# Patient Record
Sex: MALE | Race: WHITE | NOT HISPANIC OR LATINO | Employment: UNEMPLOYED | URBAN - NONMETROPOLITAN AREA
[De-identification: names, ages, dates, MRNs, and addresses within clinical notes are randomized per-mention and may not be internally consistent; named-entity substitution may affect disease eponyms.]

---

## 2023-07-11 ENCOUNTER — OFFICE VISIT (OUTPATIENT)
Dept: INTERNAL MEDICINE | Facility: OTHER | Age: 63
End: 2023-07-11
Attending: STUDENT IN AN ORGANIZED HEALTH CARE EDUCATION/TRAINING PROGRAM
Payer: COMMERCIAL

## 2023-07-11 VITALS
RESPIRATION RATE: 18 BRPM | HEIGHT: 69 IN | DIASTOLIC BLOOD PRESSURE: 72 MMHG | TEMPERATURE: 98.3 F | BODY MASS INDEX: 28.41 KG/M2 | HEART RATE: 118 BPM | WEIGHT: 191.8 LBS | OXYGEN SATURATION: 96 % | SYSTOLIC BLOOD PRESSURE: 112 MMHG

## 2023-07-11 DIAGNOSIS — Z13.220 ENCOUNTER FOR SCREENING FOR LIPID DISORDER: ICD-10-CM

## 2023-07-11 DIAGNOSIS — F17.200 TOBACCO USE DISORDER: ICD-10-CM

## 2023-07-11 DIAGNOSIS — Z11.4 SCREENING FOR HIV WITHOUT PRESENCE OF RISK FACTORS: ICD-10-CM

## 2023-07-11 DIAGNOSIS — Z12.5 ENCOUNTER FOR SCREENING FOR MALIGNANT NEOPLASM OF PROSTATE: ICD-10-CM

## 2023-07-11 DIAGNOSIS — Z71.6 ENCOUNTER FOR TOBACCO USE CESSATION COUNSELING: ICD-10-CM

## 2023-07-11 DIAGNOSIS — Z12.11 ENCOUNTER FOR SCREENING COLONOSCOPY: ICD-10-CM

## 2023-07-11 DIAGNOSIS — F43.21 ADJUSTMENT DISORDER WITH DEPRESSED MOOD: ICD-10-CM

## 2023-07-11 DIAGNOSIS — Z00.00 ANNUAL PHYSICAL EXAM: Primary | ICD-10-CM

## 2023-07-11 DIAGNOSIS — Z11.59 NEED FOR HEPATITIS C SCREENING TEST: ICD-10-CM

## 2023-07-11 DIAGNOSIS — M48.061 SPINAL STENOSIS, LUMBAR REGION, WITHOUT NEUROGENIC CLAUDICATION: ICD-10-CM

## 2023-07-11 DIAGNOSIS — R06.02 SHORTNESS OF BREATH: ICD-10-CM

## 2023-07-11 LAB
ALBUMIN SERPL BCG-MCNC: 4.4 G/DL (ref 3.5–5.2)
ALP SERPL-CCNC: 76 U/L (ref 40–129)
ALT SERPL W P-5'-P-CCNC: 14 U/L (ref 0–70)
ANION GAP SERPL CALCULATED.3IONS-SCNC: 10 MMOL/L (ref 7–15)
AST SERPL W P-5'-P-CCNC: 15 U/L (ref 0–45)
BILIRUB SERPL-MCNC: 0.2 MG/DL
BUN SERPL-MCNC: 13.3 MG/DL (ref 8–23)
CALCIUM SERPL-MCNC: 9.8 MG/DL (ref 8.8–10.2)
CHLORIDE SERPL-SCNC: 104 MMOL/L (ref 98–107)
CHOLEST SERPL-MCNC: 256 MG/DL
CREAT SERPL-MCNC: 0.73 MG/DL (ref 0.67–1.17)
DEPRECATED HCO3 PLAS-SCNC: 25 MMOL/L (ref 22–29)
GFR SERPL CREATININE-BSD FRML MDRD: >90 ML/MIN/1.73M2
GLUCOSE SERPL-MCNC: 90 MG/DL (ref 70–99)
HDLC SERPL-MCNC: 44 MG/DL
HOLD SPECIMEN: NORMAL
LDLC SERPL CALC-MCNC: 162 MG/DL
NONHDLC SERPL-MCNC: 212 MG/DL
POTASSIUM SERPL-SCNC: 4.6 MMOL/L (ref 3.4–5.3)
PROT SERPL-MCNC: 7.6 G/DL (ref 6.4–8.3)
PSA SERPL DL<=0.01 NG/ML-MCNC: 1.58 NG/ML (ref 0–4.5)
SODIUM SERPL-SCNC: 139 MMOL/L (ref 136–145)
TRIGL SERPL-MCNC: 251 MG/DL

## 2023-07-11 PROCEDURE — 87389 HIV-1 AG W/HIV-1&-2 AB AG IA: CPT | Mod: ZL | Performed by: STUDENT IN AN ORGANIZED HEALTH CARE EDUCATION/TRAINING PROGRAM

## 2023-07-11 PROCEDURE — 84153 ASSAY OF PSA TOTAL: CPT | Mod: ZL | Performed by: STUDENT IN AN ORGANIZED HEALTH CARE EDUCATION/TRAINING PROGRAM

## 2023-07-11 PROCEDURE — 90715 TDAP VACCINE 7 YRS/> IM: CPT | Performed by: STUDENT IN AN ORGANIZED HEALTH CARE EDUCATION/TRAINING PROGRAM

## 2023-07-11 PROCEDURE — 99204 OFFICE O/P NEW MOD 45 MIN: CPT | Mod: 25 | Performed by: STUDENT IN AN ORGANIZED HEALTH CARE EDUCATION/TRAINING PROGRAM

## 2023-07-11 PROCEDURE — 90471 IMMUNIZATION ADMIN: CPT | Performed by: STUDENT IN AN ORGANIZED HEALTH CARE EDUCATION/TRAINING PROGRAM

## 2023-07-11 PROCEDURE — 80053 COMPREHEN METABOLIC PANEL: CPT | Mod: ZL | Performed by: STUDENT IN AN ORGANIZED HEALTH CARE EDUCATION/TRAINING PROGRAM

## 2023-07-11 PROCEDURE — G0103 PSA SCREENING: HCPCS | Mod: ZL | Performed by: STUDENT IN AN ORGANIZED HEALTH CARE EDUCATION/TRAINING PROGRAM

## 2023-07-11 PROCEDURE — 86803 HEPATITIS C AB TEST: CPT | Mod: ZL | Performed by: STUDENT IN AN ORGANIZED HEALTH CARE EDUCATION/TRAINING PROGRAM

## 2023-07-11 PROCEDURE — 99406 BEHAV CHNG SMOKING 3-10 MIN: CPT | Performed by: STUDENT IN AN ORGANIZED HEALTH CARE EDUCATION/TRAINING PROGRAM

## 2023-07-11 PROCEDURE — 99386 PREV VISIT NEW AGE 40-64: CPT | Mod: 25 | Performed by: STUDENT IN AN ORGANIZED HEALTH CARE EDUCATION/TRAINING PROGRAM

## 2023-07-11 PROCEDURE — 36415 COLL VENOUS BLD VENIPUNCTURE: CPT | Mod: ZL | Performed by: STUDENT IN AN ORGANIZED HEALTH CARE EDUCATION/TRAINING PROGRAM

## 2023-07-11 PROCEDURE — 80061 LIPID PANEL: CPT | Mod: ZL | Performed by: STUDENT IN AN ORGANIZED HEALTH CARE EDUCATION/TRAINING PROGRAM

## 2023-07-11 RX ORDER — ALBUTEROL SULFATE 90 UG/1
2 AEROSOL, METERED RESPIRATORY (INHALATION) EVERY 4 HOURS PRN
Qty: 18 G | Refills: 3 | Status: SHIPPED | OUTPATIENT
Start: 2023-07-11

## 2023-07-11 RX ORDER — BUPROPION HYDROCHLORIDE 150 MG/1
150 TABLET ORAL EVERY MORNING
Qty: 90 TABLET | Refills: 4 | Status: SHIPPED | OUTPATIENT
Start: 2023-07-11

## 2023-07-11 ASSESSMENT — PAIN SCALES - GENERAL: PAINLEVEL: MILD PAIN (3)

## 2023-07-11 NOTE — LETTER
July 17, 2023      Ray Ace  [address] [place] [address]        Dear ,    We are writing to inform you of your test results.    Your laboratory results are below.  Your kidney function electrolytes and liver testing is all normal.  Your cholesterol is markedly elevated.  I would like to discuss with you starting a cholesterol-lowering medication to help prevent your risk of heart disease and stroke at your next visit.    Resulted Orders   Comprehensive Metabolic Panel   Result Value Ref Range    Sodium 139 136 - 145 mmol/L    Potassium 4.6 3.4 - 5.3 mmol/L    Chloride 104 98 - 107 mmol/L    Carbon Dioxide (CO2) 25 22 - 29 mmol/L    Anion Gap 10 7 - 15 mmol/L    Urea Nitrogen 13.3 8.0 - 23.0 mg/dL    Creatinine 0.73 0.67 - 1.17 mg/dL    Calcium 9.8 8.8 - 10.2 mg/dL    Glucose 90 70 - 99 mg/dL    Alkaline Phosphatase 76 40 - 129 U/L    AST 15 0 - 45 U/L      Comment:      Reference intervals for this test were updated on 6/12/2023 to more accurately reflect our healthy population. There may be differences in the flagging of prior results with similar values performed with this method. Interpretation of those prior results can be made in the context of the updated reference intervals.    ALT 14 0 - 70 U/L      Comment:      Reference intervals for this test were updated on 6/12/2023 to more accurately reflect our healthy population. There may be differences in the flagging of prior results with similar values performed with this method. Interpretation of those prior results can be made in the context of the updated reference intervals.      Protein Total 7.6 6.4 - 8.3 g/dL    Albumin 4.4 3.5 - 5.2 g/dL    Bilirubin Total 0.2 <=1.2 mg/dL    GFR Estimate >90 >60 mL/min/1.73m2   Lipid Panel   Result Value Ref Range    Cholesterol 256 (H) <200 mg/dL    Triglycerides 251 (H) <150 mg/dL    Direct Measure HDL 44 >=40 mg/dL    LDL Cholesterol Calculated 162 (H) <=100 mg/dL    Non HDL  Cholesterol 212 (H) <130 mg/dL    Narrative    Cholesterol  Desirable:  <200 mg/dL    Triglycerides  Normal:  Less than 150 mg/dL  Borderline High:  150-199 mg/dL  High:  200-499 mg/dL  Very High:  Greater than or equal to 500 mg/dL    Direct Measure HDL  Female:  Greater than or equal to 50 mg/dL   Male:  Greater than or equal to 40 mg/dL    LDL Cholesterol  Desirable:  <100mg/dL  Above Desirable:  100-129 mg/dL   Borderline High:  130-159 mg/dL   High:  160-189 mg/dL   Very High:  >= 190 mg/dL    Non HDL Cholesterol  Desirable:  130 mg/dL  Above Desirable:  130-159 mg/dL  Borderline High:  160-189 mg/dL  High:  190-219 mg/dL  Very High:  Greater than or equal to 220 mg/dL   HIV Antigen Antibody Combo   Result Value Ref Range    HIV Antigen Antibody Combo Nonreactive Nonreactive      Comment:      HIV-1 p24 Ag & HIV-1/HIV-2 Ab Not Detected   Hepatitis C Screen Reflex to HCV RNA Quant and Genotype   Result Value Ref Range    Hepatitis C Antibody Nonreactive Nonreactive    Narrative    Assay performance characteristics have not been established for newborns, infants, and children.   PSA Screen GH   Result Value Ref Range    Prostate Specific Antigen Screen 1.58 0.00 - 4.50 ng/mL    Narrative    This result is obtained using the Roche Elecsys total PSA method on the ehsan e601 immunoassay analyzer. Results obtained with different assay methods or kits cannot be used interchangeably.       If you have any questions or concerns, please call the clinic at the number listed above.       Sincerely,      Torrey Mancuso MD

## 2023-07-11 NOTE — PROGRESS NOTES
SUBJECTIVE:   CC: Ray is an 63 year old who presents for preventative health visit.        No data to display              HPI    Three concerns.     1) COPD  -smoker, wants to quit  -can't quit this time.   -No stamina, 10-20 min then has to sit down.   -2 bouts of bronchitis in past year.   - smoking less than a pack per day.   - smoking 40+ years.   Has to sit down due to shortness of breath and fatigue.   Purses lips when breathing out.   No chest discomfort.   No new swelling   No palpitations  Has inhaler, doesn't seem to help him.     Doesn't think he can work currently. Wants to go back to work.     2) mobility  Less mobile.   Low back discomfort  Also stiff in low back and legs.   Had been immobile last year or so.   Difficulty getting up.   Limited muscle tone in thighs.   Limited range of motion.       3) mild depression as a result of 1 and 2.     Discussed and updated his past medical history surgical history social history and medications.  Discussed age-appropriate relevant screening tests.      Have you ever done Advance Care Planning? (For example, a Health Directive, POLST, or a discussion with a medical provider or your loved ones about your wishes): No, advance care planning information given to patient to review.  Patient plans to discuss their wishes with loved ones or provider.      Social History     Tobacco Use     Smoking status: Every Day     Types: Cigarettes     Smokeless tobacco: Never   Substance Use Topics     Alcohol use: Yes     Comment: very rarely              No data to display                Last PSA:   Prostate Specific Antigen Screen   Date Value Ref Range Status   07/11/2023 1.58 0.00 - 4.50 ng/mL Final       Reviewed orders with patient. Reviewed health maintenance and updated orders accordingly - Yes  Lab work is in process    Reviewed and updated as needed this visit by clinical staff   Tobacco  Allergies  Meds      Soc Hx        Reviewed and updated as needed this  "visit by Provider                 No past medical history on file.   Past Surgical History:   Procedure Laterality Date     COLONOSCOPY      Age 36 due to bleeding     wisdom teeth      in teens       Review of Systems  Comprehensive 12 point review of systems other than those listed in the HPI are otherwise negative.       OBJECTIVE:   /72 (BP Location: Right arm, Patient Position: Sitting, Cuff Size: Adult Regular)   Pulse 118   Temp 98.3  F (36.8  C) (Temporal)   Resp 18   Ht 1.74 m (5' 8.5\")   Wt 87 kg (191 lb 12.8 oz)   SpO2 96%   BMI 28.74 kg/m      Physical Exam  Vitals and nursing note reviewed.   Constitutional:       General: He is not in acute distress.     Appearance: He is well-developed. He is not diaphoretic.   HENT:      Head: Normocephalic and atraumatic.      Mouth/Throat:      Pharynx: Oropharynx is clear.   Eyes:      General: No scleral icterus.     Conjunctiva/sclera: Conjunctivae normal.   Cardiovascular:      Rate and Rhythm: Normal rate and regular rhythm.   Pulmonary:      Effort: Pulmonary effort is normal.      Breath sounds: Normal breath sounds.   Abdominal:      Palpations: Abdomen is soft.      Tenderness: There is no abdominal tenderness.   Musculoskeletal:         General: No deformity.      Cervical back: Neck supple.   Lymphadenopathy:      Cervical: No cervical adenopathy.   Skin:     General: Skin is warm and dry.      Coloration: Skin is not jaundiced.      Findings: No rash.      Comments: Multiple seborrheic keratoses, no hypermelanotic lesions or atypical nevi.   Neurological:      General: No focal deficit present.      Mental Status: He is alert. Mental status is at baseline.   Psychiatric:         Mood and Affect: Mood normal.         Behavior: Behavior normal.           ASSESSMENT/PLAN:       ICD-10-CM    1. Annual physical exam  Z00.00 Comprehensive Metabolic Panel     Comprehensive Metabolic Panel      2. Encounter for screening colonoscopy  Z12.11 " Colonoscopy Screening  Referral      3. Spinal stenosis, lumbar region, without neurogenic claudication  M48.061 Physical Therapy Referral      4. Need for hepatitis C screening test  Z11.59 Hepatitis C Screen Reflex to HCV RNA Quant and Genotype     Hepatitis C Screen Reflex to HCV RNA Quant and Genotype      5. Screening for HIV without presence of risk factors  Z11.4 HIV Antigen Antibody Combo     HIV Antigen Antibody Combo      6. Encounter for screening for malignant neoplasm of prostate  Z12.5 PSA Screen GH     PSA Screen GH      7. Encounter for screening for lipid disorder  Z13.220 Lipid Panel     Lipid Panel      8. Encounter for tobacco use cessation counseling  Z71.6 buPROPion (WELLBUTRIN XL) 150 MG 24 hr tablet     SMOKING CESSATION COUNSELING, 3-10 MIN      9. Tobacco use disorder  F17.200 albuterol (PROAIR HFA/PROVENTIL HFA/VENTOLIN HFA) 108 (90 Base) MCG/ACT inhaler     buPROPion (WELLBUTRIN XL) 150 MG 24 hr tablet      10. Shortness of breath  R06.02 albuterol (PROAIR HFA/PROVENTIL HFA/VENTOLIN HFA) 108 (90 Base) MCG/ACT inhaler     Pulmonary Function Test ()      11. Adjustment disorder with depressed mood  F43.21         Annual physical exam, establish care: Updated past medical history surgical history social history and medications.  High suspicion the patient has COPD given his prolonged smoking history and shortness of breath with pursing of his lips.  We will obtain basic PFTs and was given a refill for albuterol.  Discussed smoking cessation for 5 minutes.  The patient would like to try bupropion in addition to quitting cold turkey.  He will let me know if he like to try patches or gum on top of this.  Hopefully the bupropion will also help with his mild depression.  Patient also has poor mobility, exercise tolerance and limited range of motion.  Endorses an immobile lifestyle.  Will refer to physical therapy for strengthening, range of motion and if still ongoing could pursue  "MRI of the back for further evaluation of his lumbar stenosis and could pursue rheumatologic work-up at that time if necessary.    Colonoscopy and basic screening labs were ordered today.    Patient has been advised of split billing requirements and indicates understanding: Yes      COUNSELING:   Reviewed preventive health counseling, as reflected in patient instructions      BMI:   Estimated body mass index is 28.74 kg/m  as calculated from the following:    Height as of this encounter: 1.74 m (5' 8.5\").    Weight as of this encounter: 87 kg (191 lb 12.8 oz).         He reports that he has been smoking cigarettes. He has never used smokeless tobacco.  Nicotine/Tobacco Cessation Plan:   Above        Torrey Mancuso MD  [facility] AND [facility]  "

## 2023-07-11 NOTE — NURSING NOTE
"Chief Complaint   Patient presents with     Physical     And est care       Medication reconciliation completed.    FOOD SECURITY SCREENING QUESTIONS:    The next two questions are to help us understand your food security.  If you are feeling you need any assistance in this area, we have resources available to support you today.    Hunger Vital Signs:  Within the past 12 months we worried whether our food would run out before we got money to buy more. Never  Within the past 12 months the food we bought just didn't last and we didn't have money to get more. Never    Initial /72 (BP Location: Right arm, Patient Position: Sitting, Cuff Size: Adult Regular)   Pulse 118   Temp 98.3  F (36.8  C) (Temporal)   Resp 18   Ht 1.74 m (5' 8.5\")   Wt 87 kg (191 lb 12.8 oz)   SpO2 96%   BMI 28.74 kg/m   Estimated body mass index is 28.74 kg/m  as calculated from the following:    Height as of this encounter: 1.74 m (5' 8.5\").    Weight as of this encounter: 87 kg (191 lb 12.8 oz).       Naheed Mccord LPN .......  7/11/2023  1:19 PM  "

## 2023-07-13 LAB
HCV AB SERPL QL IA: NONREACTIVE
HIV 1+2 AB+HIV1 P24 AG SERPL QL IA: NONREACTIVE

## 2023-07-14 DIAGNOSIS — Z12.11 SCREENING FOR COLON CANCER: Primary | ICD-10-CM

## 2023-07-14 NOTE — TELEPHONE ENCOUNTER
Screening Questions for the Scheduling of Screening Colonoscopies   (If Colonoscopy is diagnostic, Provider should review the chart before scheduling.)  Are you younger than 50 or older than 80?  NO  Do you take aspirin or fish oil?  NO (if yes, tell patient to stop 1 week prior to Colonoscopy)  Do you take warfarin (Coumadin), clopidogrel (Plavix), apixaban (Eliquis), dabigatram (Pradaxa), rivaroxaban (Xarelto) or any blood thinner? NO  Do you use oxygen at home?  NO  Do you have kidney disease? NO  Are you on dialysis? NO  Have you had a stroke or heart attack in the last year? NO  Have you had a stent in your heart or any blood vessel in the last year? NO  Have you had a transplant of any organ? NO  Have you had a colonoscopy or upper endoscopy (EGD) before? YES         When?  25 YEARS AGO  Date of scheduled Colonoscopy. 10/17/2023  Provider [facility]  Pharmacy [facility]

## 2023-07-17 RX ORDER — BISACODYL 5 MG
TABLET, DELAYED RELEASE (ENTERIC COATED) ORAL
Qty: 2 TABLET | Refills: 0 | Status: ON HOLD | OUTPATIENT
Start: 2023-07-17 | End: 2023-10-17

## 2023-07-17 RX ORDER — POLYETHYLENE GLYCOL 3350, SODIUM CHLORIDE, SODIUM BICARBONATE, POTASSIUM CHLORIDE 420; 11.2; 5.72; 1.48 G/4L; G/4L; G/4L; G/4L
4000 POWDER, FOR SOLUTION ORAL ONCE
Qty: 4000 ML | Refills: 0 | Status: SHIPPED | OUTPATIENT
Start: 2023-07-17 | End: 2023-07-17

## 2023-07-27 ENCOUNTER — THERAPY VISIT (OUTPATIENT)
Dept: PHYSICAL THERAPY | Facility: OTHER | Age: 63
End: 2023-07-27
Attending: STUDENT IN AN ORGANIZED HEALTH CARE EDUCATION/TRAINING PROGRAM
Payer: COMMERCIAL

## 2023-07-27 DIAGNOSIS — M48.061 SPINAL STENOSIS, LUMBAR REGION, WITHOUT NEUROGENIC CLAUDICATION: ICD-10-CM

## 2023-07-27 PROCEDURE — 97110 THERAPEUTIC EXERCISES: CPT | Mod: GP

## 2023-07-27 PROCEDURE — 97161 PT EVAL LOW COMPLEX 20 MIN: CPT | Mod: GP

## 2023-07-27 NOTE — PROGRESS NOTES
"PHYSICAL THERAPY EVALUATION  Type of Visit: Evaluation    See electronic medical record for Abuse and Falls Screening details.    Subjective       Presenting condition or subjective complaint:   Patient reports he has had progressive pain/stiffness in B hips/LE s for the past 10 years -- reports it has been more rapid in the last 2 years. Reports dull/achey pain with movement; will have sharp pain on the lateral/posterior sides on hips. Reports L is worse than R. Reports sitting/rest has no pain but once he tries to stand or do any mobility will be instantaneous. Reports worst pain with bending, lifting, squatting -- reports difficulty with getting shoes/socks on and needs to use aids. States he has found himself bending forward to pick something off the ground and will \"tip forward\". States he is unable to get off of ground without UE support. States he was in [place] last year and was walking up to a mile but currently unable to walk more than a block or so. States he has to use the cart to make it through [facility]. Patient does frequently mention he has difficulty maintaining motivation to \"do stretches/exercises/things I should do.\" Denies numbness/tingling in LE. Reports he is a side sleeper and when rolling to opposite side the superior hip will be unbearable pain. Patient states he feels like his legs have no strength.    Relevant medical history:   none  Dates & types of surgery:   none    Prior diagnostic imaging/testing results:     none in chart -- patient reports he had an x-ray ~10 years ago which shoulder spinal stenosis  Prior therapy history for the same diagnosis, illness or injury:     none    Prior Level of Function  Transfers: Independent  Ambulation: Independent  ADL: Independent, requires use of adaptive equipment  IADL: aggravated with housekeeping    Living Environment  Social support:   wife  Type of home:     Stairs to enter the home:        steps (4-5) to deck  Ramp:     Stairs inside the " "home:       flight of stairs, reciprocal - patient reports he is fearful of caring objects up/down stairs  Help at home:     Equipment owned:   uses sock aid, grabber, shoe horn     Employment:     retired/wants to return to work  Hobbies/Interests:      Patient goals for therapy:   \"I want to know if this is something we can fix or if surgery is my next option\"    Pain assessment: See objective evaluation for additional pain details     Objective   HIP EVALUATION  PAIN: Pain Level at Rest: 2/10  Pain Level with Use: 7/10  Pain Location: lumbar spine and hip  Pain Quality: Dull and Sharp  Pain Frequency: constant  Pain is Worst: bending, lifting, squatting  Pain is Exacerbated By: bending, sitting, walking, certain positions, bending, daily self-care tasks  Pain is Relieved By: rest  Pain Progression: Worsened  INTEGUMENTARY (edema, incisions): WNL  POSTURE:  forward flexed posture, forward head  GAIT:   Weightbearing Status: WBAT  Assistive Device(s): None  Gait Deviations:  decreased lumbopelvic rotation; somewhat vaulting on the R  BALANCE/PROPRIOCEPTION: Single Leg Stance Eyes Open (seconds): 30 seconds B without UE support; trunk compensated trendelenburg B; ankle response  WEIGHTBEARING ALIGNMENT:  R ASIS superior to L  ROM:   (Degrees) Left AROM Left PROM  Right AROM Right PROM   Hip Flexion 90  90    Hip Extension 0  0    Hip Abduction 5  5    Hip Adduction       Hip Internal Rotation 0  0    Hip External Rotation 35  35    Knee Flexion       Knee Extension       Lumbar Side glide     Lumbar Flexion Minimal lumbar flexion movement; compensates with thoracic spine   Lumbar Extension    Pain:   End feel: hard end feel with hip ROM  PELVIC/SI SCREEN: Standing Forward Bend: compensates with thoracic spine - no difference in PSIS location  STRENGTH:   Pain: - none + mild ++ moderate +++ severe  Strength Scale: 0-5/5 Left Right   Hip Flexion 3 3   Hip Extension 3 3   Hip Abduction *in limited range 4 4   Hip " Adduction 3 3   Hip Internal Rotation     Hip External Rotation     Knee Flexion 3 3   Knee Extension 4 4     LE FLEXIBILITY: Decreased piriformis L, Decreased hip flexors L, Decreased quadriceps L, Decreased hamstrings L, Decreased piriformis R, Decreased hip flexors R, Decreased quadriceps R, Decreased hamstrings R  SPECIAL TESTS:    Left Right   PAYAL Negative  Negative    FADIR/Labrum/WILLIE Negative  Negative    Femoral Nerve       Tressa's     Piriformis     Quadrant Testing     SLR     Slump     Stork with Extension     Jimmie Positive Positive          FUNCTIONAL TESTS: SLS: see above  PALPATION:  denies pain with palpation of B greater trochanter, PSIS  LUMBAR SPINE EVALUATION  PALPATION:  denies pain with palpation of lumbar spinous/transverse processes  SPINAL SEGMENTAL CONCLUSIONS:  hypomobile PA glides from L1-L5;      Assessment & Plan   CLINICAL IMPRESSIONS  Medical Diagnosis: M48.061 (ICD-10-CM) - Spinal stenosis, lumbar region, without neurogenic claudication    Treatment Diagnosis: B hip pain and weakness   Impression/Assessment: Patient is a 63 year old male with low back and B hip pain complaints.  The following significant findings have been identified: Pain, Decreased ROM/flexibility, Decreased joint mobility, Decreased strength, Impaired balance, Impaired gait, Impaired muscle performance, Decreased activity tolerance, and Impaired posture. These impairments interfere with their ability to perform self care tasks and community mobility as compared to previous level of function.     Clinical Decision Making (Complexity):  Clinical Presentation: Stable/Uncomplicated  Clinical Presentation Rationale: based on medical and personal factors listed in PT evaluation  Clinical Decision Making (Complexity): Low complexity    PLAN OF CARE  Treatment Interventions:  Modalities: Cryotherapy, E-stim, Hot Pack  Interventions: Gait Training, Manual Therapy, Neuromuscular Re-education, Therapeutic Activity,  Therapeutic Exercise    Long Term Goals     PT Goal 1  Goal Identifier: proximal hip strength  Goal Description: Patient will increase proximal hip strength to 4+/5 in all directions within 6 weeks  Target Date: 09/07/23  PT Goal 2  Goal Identifier: gait mechanics/distance  Goal Description: Patient will demonstrate upright posture and increased lumbopelvic rotation with ambulation and report ability to ambulate 1 mile within 6 weeks  Target Date: 09/07/23  PT Goal 3  Goal Identifier: HEP  Goal Description: Patient will perform HEP independently with apporpriate progressions within 2 weeks  Target Date: 08/10/23      Frequency of Treatment: 2x week  Duration of Treatment: 6 weeks    Recommended Referrals to Other Professionals:   Education Assessment:   Learner/Method: Patient;Listening;Reading;Demonstration;Pictures/Video    Risks and benefits of evaluation/treatment have been explained.   Patient/Family/caregiver agrees with Plan of Care.     Evaluation Time:     PT Eval, Low Complexity Minutes (79436): 30       Signing Clinician: Bel Baker PT

## 2023-07-28 ENCOUNTER — HOSPITAL ENCOUNTER (OUTPATIENT)
Dept: RESPIRATORY THERAPY | Facility: OTHER | Age: 63
Discharge: HOME | End: 2023-07-28
Attending: STUDENT IN AN ORGANIZED HEALTH CARE EDUCATION/TRAINING PROGRAM | Admitting: STUDENT IN AN ORGANIZED HEALTH CARE EDUCATION/TRAINING PROGRAM
Payer: COMMERCIAL

## 2023-07-28 DIAGNOSIS — R06.02 SHORTNESS OF BREATH: ICD-10-CM

## 2023-07-28 PROCEDURE — 94726 PLETHYSMOGRAPHY LUNG VOLUMES: CPT

## 2023-07-28 PROCEDURE — 94729 DIFFUSING CAPACITY: CPT | Mod: 26 | Performed by: INTERNAL MEDICINE

## 2023-07-28 PROCEDURE — 94060 EVALUATION OF WHEEZING: CPT

## 2023-07-28 PROCEDURE — 999N000157 HC STATISTIC RCP TIME EA 10 MIN

## 2023-07-28 PROCEDURE — 94060 EVALUATION OF WHEEZING: CPT | Mod: 26 | Performed by: INTERNAL MEDICINE

## 2023-07-28 PROCEDURE — 250N000009 HC RX 250: Performed by: STUDENT IN AN ORGANIZED HEALTH CARE EDUCATION/TRAINING PROGRAM

## 2023-07-28 PROCEDURE — 94729 DIFFUSING CAPACITY: CPT

## 2023-07-28 PROCEDURE — 94726 PLETHYSMOGRAPHY LUNG VOLUMES: CPT | Mod: 26 | Performed by: INTERNAL MEDICINE

## 2023-07-28 RX ORDER — ALBUTEROL SULFATE 0.83 MG/ML
2.5 SOLUTION RESPIRATORY (INHALATION) ONCE
Status: COMPLETED | OUTPATIENT
Start: 2023-07-28 | End: 2023-07-28

## 2023-07-28 RX ADMIN — ALBUTEROL SULFATE 2.5 MG: 2.5 SOLUTION RESPIRATORY (INHALATION) at 09:45

## 2023-07-31 ENCOUNTER — THERAPY VISIT (OUTPATIENT)
Dept: PHYSICAL THERAPY | Facility: OTHER | Age: 63
End: 2023-07-31
Attending: STUDENT IN AN ORGANIZED HEALTH CARE EDUCATION/TRAINING PROGRAM
Payer: COMMERCIAL

## 2023-07-31 DIAGNOSIS — M48.061 SPINAL STENOSIS, LUMBAR REGION, WITHOUT NEUROGENIC CLAUDICATION: Primary | ICD-10-CM

## 2023-07-31 PROCEDURE — 97110 THERAPEUTIC EXERCISES: CPT | Mod: GP

## 2023-08-03 ENCOUNTER — THERAPY VISIT (OUTPATIENT)
Dept: PHYSICAL THERAPY | Facility: OTHER | Age: 63
End: 2023-08-03
Attending: STUDENT IN AN ORGANIZED HEALTH CARE EDUCATION/TRAINING PROGRAM
Payer: COMMERCIAL

## 2023-08-03 DIAGNOSIS — M48.061 SPINAL STENOSIS, LUMBAR REGION, WITHOUT NEUROGENIC CLAUDICATION: Primary | ICD-10-CM

## 2023-08-03 PROCEDURE — 97110 THERAPEUTIC EXERCISES: CPT | Mod: GP

## 2023-08-07 ENCOUNTER — THERAPY VISIT (OUTPATIENT)
Dept: PHYSICAL THERAPY | Facility: OTHER | Age: 63
End: 2023-08-07
Attending: STUDENT IN AN ORGANIZED HEALTH CARE EDUCATION/TRAINING PROGRAM
Payer: COMMERCIAL

## 2023-08-07 DIAGNOSIS — M48.061 SPINAL STENOSIS, LUMBAR REGION, WITHOUT NEUROGENIC CLAUDICATION: Primary | ICD-10-CM

## 2023-08-07 PROCEDURE — 97110 THERAPEUTIC EXERCISES: CPT | Mod: GP

## 2023-08-10 ENCOUNTER — THERAPY VISIT (OUTPATIENT)
Dept: PHYSICAL THERAPY | Facility: OTHER | Age: 63
End: 2023-08-10
Attending: STUDENT IN AN ORGANIZED HEALTH CARE EDUCATION/TRAINING PROGRAM
Payer: COMMERCIAL

## 2023-08-10 DIAGNOSIS — M48.061 SPINAL STENOSIS, LUMBAR REGION, WITHOUT NEUROGENIC CLAUDICATION: Primary | ICD-10-CM

## 2023-08-10 PROCEDURE — 97110 THERAPEUTIC EXERCISES: CPT | Mod: GP

## 2023-08-14 ENCOUNTER — THERAPY VISIT (OUTPATIENT)
Dept: PHYSICAL THERAPY | Facility: OTHER | Age: 63
End: 2023-08-14
Attending: STUDENT IN AN ORGANIZED HEALTH CARE EDUCATION/TRAINING PROGRAM
Payer: COMMERCIAL

## 2023-08-14 DIAGNOSIS — M48.061 SPINAL STENOSIS, LUMBAR REGION, WITHOUT NEUROGENIC CLAUDICATION: Primary | ICD-10-CM

## 2023-08-14 PROCEDURE — 97110 THERAPEUTIC EXERCISES: CPT | Mod: GP

## 2023-08-17 ENCOUNTER — THERAPY VISIT (OUTPATIENT)
Dept: PHYSICAL THERAPY | Facility: OTHER | Age: 63
End: 2023-08-17
Attending: STUDENT IN AN ORGANIZED HEALTH CARE EDUCATION/TRAINING PROGRAM
Payer: COMMERCIAL

## 2023-08-17 DIAGNOSIS — M48.061 SPINAL STENOSIS, LUMBAR REGION, WITHOUT NEUROGENIC CLAUDICATION: Primary | ICD-10-CM

## 2023-08-17 PROCEDURE — 97110 THERAPEUTIC EXERCISES: CPT | Mod: GP

## 2023-08-17 NOTE — PROGRESS NOTES
"   08/17/23 0500   Appointment Info   Signing clinician's name / credentials Bel Baker, PT   Visits Used 7   Medical Diagnosis M48.061 (ICD-10-CM) - Spinal stenosis, lumbar region, without neurogenic claudication   PT Tx Diagnosis B hip pain and weakness   Progress Note/Certification   Therapy Frequency 2x week   Predicted Duration 6 weeks   GOALS   PT Goals 2;3   PT Goal 1   Goal Identifier proximal hip strength   Goal Description Patient will increase proximal hip strength to 4+/5 in all directions within 6 weeks   Goal Progress see objective measures; hip abduction/extension 4+/5; hip flexion 3+/5   Target Date 09/07/23   PT Goal 2   Goal Identifier gait mechanics/distance   Goal Description Patient will demonstrate upright posture and increased lumbopelvic rotation with ambulation and report ability to ambulate 1 mile within 6 weeks   Goal Progress patient reports ability to walk longer distance/tolerance but overall no major change in ADL/function   Target Date 09/07/23   PT Goal 3   Goal Identifier HEP   Goal Description Patient will perform HEP independently with apporpriate progressions within 2 weeks   Goal Progress met   Target Date 08/10/23   Date Met 08/17/23   Subjective Report   Subjective Report Patient reports he is tired this AM - reports breathing a little labored due to smoke in the air. Reports soreness is present. States overall with therapy can see a difference in his strength but not much in terms of ADLs (bending forward to get socks on, pick something off the floor, etc.) -- states he continues to feel like he gets \"stuck\" but has more strength to get back up.   Objective Measures   Objective Measures Objective Measure 1;Objective Measure 2   Objective Measure 1   Objective Measure B hip strength   Details hip flexion 3+/5; hip abduction 4+/5; hip extension 4+/5   Objective Measure 2   Objective Measure B hip ROM   Details hip flexion ~90 degrees; 0 degrees IR; ~15 degrees hip abduction " "with hard endfeel   Treatment Interventions (PT)   Interventions Therapeutic Procedure/Exercise   Therapeutic Procedure/Exercise   Therapeutic Procedures: strength, endurance, ROM, flexibillity minutes (28168) 45   Ther Proc 1 SciFit xL3 x9 min; lower trunk rotation x10 reps B, SKTC x20 seconds x3 reps B (hold due to groin discomfort), supine hip flexor stretch with opposite LE hang off side of mat table; hamstring stretch supine with band x20 seconds B; bridge x10 reps with cues for eccentric control and 5 second hold at time x10 reps RTB with hip abduction; clam shells x10 reps x2 sets B RTB; sit to stand from low surface x10 reps x2 sets; side steps without resistance -- patient able to perform to ~hip width apart B; standing hip extension x10 reps B (cues for not arching back or trunk movement); lateral step ups 6\" step x15 reps B   Ther Proc 1 - Details added RTB and standing hip extension   Skilled Intervention education on HEP, building exercises/stretches into day to increase likelihood of completion; education on tests/measures and findings   Patient Response/Progress reports he feels stronger but continues to hit his ROM limits   Education   Learner/Method Patient;Listening;Reading;Demonstration;Pictures/Video   Plan   Home program lower trunk rotation, SKTC, hamstring stretch - added 8/3: bridge, clam shells, side steps without resistance   Plan for next session patient continues to have limited ROM and limited change in function over the last month -- has been consistently performing HEP and increased strength demonstrated but continue to have significant limited ROM in B hips -- recommend return to MD for follow-up and x-rays if possible.   Total Session Time   Timed Code Treatment Minutes 45   Total Treatment Time (sum of timed and untimed services) 45         PLAN  Return to MD, follow-up for imaging of B hips -- has demonstrated increased strength in all planes at B hips but no change in ROM with " slight improvement in function. Cued to continue HEP in meantime.    Beginning/End Dates of Progress Note Reporting Period:   7/27/2023 to 08/17/2023    Referring Provider:  MD Bel Mills, PT

## 2023-08-22 ENCOUNTER — TELEPHONE (OUTPATIENT)
Dept: INTERNAL MEDICINE | Facility: OTHER | Age: 63
End: 2023-08-22
Payer: COMMERCIAL

## 2023-08-22 NOTE — TELEPHONE ENCOUNTER
Patient called and states PT thought he might need an x-ray.  The patient is thinking it would be nice to have that done by the time he follows up with Dr Mancuso.  Please call and advise if there is an x-ray order so he can schedule.

## 2023-08-22 NOTE — TELEPHONE ENCOUNTER
After verifying patient's name and date of birth, patient was wondering if Dr. Mancuso would like to order an xray as that is what PT has suggested at his last visit.    Patient was having PT for mobility issues.    Naheed Mccord LPN....8/22/2023 4:34 PM

## 2023-08-24 NOTE — TELEPHONE ENCOUNTER
Can discuss at next visit.  May perform x-ray or MRI based on physical exam findings.  Can usually get an x-ray done same day quite quickly.    Torrey Mancuso MD on 8/24/2023 at 10:18 AM

## 2023-09-25 ENCOUNTER — OFFICE VISIT (OUTPATIENT)
Dept: INTERNAL MEDICINE | Facility: OTHER | Age: 63
End: 2023-09-25
Attending: STUDENT IN AN ORGANIZED HEALTH CARE EDUCATION/TRAINING PROGRAM
Payer: COMMERCIAL

## 2023-09-25 ENCOUNTER — HOSPITAL ENCOUNTER (OUTPATIENT)
Dept: GENERAL RADIOLOGY | Facility: OTHER | Age: 63
Discharge: HOME | End: 2023-09-25
Attending: STUDENT IN AN ORGANIZED HEALTH CARE EDUCATION/TRAINING PROGRAM
Payer: COMMERCIAL

## 2023-09-25 VITALS
TEMPERATURE: 96.9 F | OXYGEN SATURATION: 98 % | HEART RATE: 105 BPM | BODY MASS INDEX: 29.95 KG/M2 | SYSTOLIC BLOOD PRESSURE: 122 MMHG | HEIGHT: 68 IN | RESPIRATION RATE: 16 BRPM | WEIGHT: 197.6 LBS | DIASTOLIC BLOOD PRESSURE: 80 MMHG

## 2023-09-25 DIAGNOSIS — M25.652 DECREASED RANGE OF MOTION OF BOTH HIPS: ICD-10-CM

## 2023-09-25 DIAGNOSIS — M54.16 LUMBAR RADICULOPATHY: ICD-10-CM

## 2023-09-25 DIAGNOSIS — M25.651 DECREASED RANGE OF MOTION OF BOTH HIPS: ICD-10-CM

## 2023-09-25 DIAGNOSIS — M16.0 PRIMARY OSTEOARTHRITIS OF BOTH HIPS: ICD-10-CM

## 2023-09-25 DIAGNOSIS — M54.16 LUMBAR RADICULOPATHY: Primary | ICD-10-CM

## 2023-09-25 DIAGNOSIS — E78.2 MIXED HYPERLIPIDEMIA: ICD-10-CM

## 2023-09-25 DIAGNOSIS — Z71.6 ENCOUNTER FOR SMOKING CESSATION COUNSELING: ICD-10-CM

## 2023-09-25 PROCEDURE — 99215 OFFICE O/P EST HI 40 MIN: CPT | Mod: 25 | Performed by: STUDENT IN AN ORGANIZED HEALTH CARE EDUCATION/TRAINING PROGRAM

## 2023-09-25 PROCEDURE — 99406 BEHAV CHNG SMOKING 3-10 MIN: CPT | Performed by: STUDENT IN AN ORGANIZED HEALTH CARE EDUCATION/TRAINING PROGRAM

## 2023-09-25 PROCEDURE — 73522 X-RAY EXAM HIPS BI 3-4 VIEWS: CPT

## 2023-09-25 RX ORDER — ROSUVASTATIN CALCIUM 10 MG/1
10 TABLET, COATED ORAL DAILY
Qty: 90 TABLET | Refills: 4 | Status: SHIPPED | OUTPATIENT
Start: 2023-09-25

## 2023-09-25 ASSESSMENT — PAIN SCALES - GENERAL: PAINLEVEL: MILD PAIN (2)

## 2023-09-25 NOTE — NURSING NOTE
"Chief Complaint   Patient presents with    RECHECK     Follow up on labs and PT     Patient presents to the clinic today for a follow up for labs and PT  Initial There were no vitals taken for this visit. Estimated body mass index is 28.74 kg/m  as calculated from the following:    Height as of 7/11/23: 1.74 m (5' 8.5\").    Weight as of 7/11/23: 87 kg (191 lb 12.8 oz).  Meds Reconciled: complete        FOOD SECURITY SCREENING QUESTIONS:    The next two questions are to help us understand your food security.  If you are feeling you need any assistance in this area, we have resources available to support you today.    Hunger Vital Signs:  Within the past 12 months we worried whether our food would run out before we got money to buy more. Never  Within the past 12 months the food we bought just didn't last and we didn't have money to get more. Never  Elizabeth Wheeler LPN,LPN on 9/25/2023 at 8:57 AM      Elizabeth Wheeler LPN  "

## 2023-09-25 NOTE — PROGRESS NOTES
Assessment & Plan         ICD-10-CM    1. Lumbar radiculopathy  M54.16 MR Lumbar Spine w/o Contrast     XR Pelvis and Hip Bilateral 2 Views      2. Decreased range of motion of both hips  M25.651 XR Pelvis and Hip Bilateral 2 Views    M25.652       3. Mixed hyperlipidemia  E78.2 rosuvastatin (CRESTOR) 10 MG tablet      4. Primary osteoarthritis of both hips  M16.0 Orthopedic  Referral      5. Encounter for smoking cessation counseling  Z71.6 SMOKING CESSATION COUNSELING, 3-10 MIN        Lumbar back pain and decrease in range of motion in both hips.  Physical therapy has somewhat improved the pain but still has very limited range of motion of his hips.  X-rays of the hips demonstrate osteoarthritis with no fracture or cystic lesions.  Will refer to sports medicine to try hip injection versus other evaluation.  Also ordered a MRI of the lumbar spine to see if lumbar radiculopathy is playing a role.  Could try lumbar injection/referral to spine surgery also.  No focal weakness.  Did recommend that he continue doing his physical therapy at home.    Hyperlipidemia: Discussed starting rosuvastatin given his marked hyperlipidemia and family history.    Smoking cessation: Discussed smoking cessation and his concerns about bupropion for 3 minutes.    43 minutes spent by me on the date of the encounter doing chart review, discussion of patient images with radiology, reviewing images with patient, history and exam, documentation and further activities per the note        No follow-ups on file.    Torrey Mancuso MD  [facility] AND [facility]      Dale Bell is a 63 year old, presenting for the following health issues:  RECHECK (Follow up on labs and PT)        9/25/2023     8:57 AM   Additional Questions   Roomed by Elizabeth PRINCE       History of Present Illness       Back Pain:  He presents for follow up of back pain. Patient's back pain is a chronic problem.  Location of back pain:  Right middle of back,  right hip and left hip  Description of back pain: dull ache and sharp  Back pain spreads: right thigh and left thigh    Since patient first noticed back pain, pain is: gradually worsening  Does back pain interfere with his job:  Yes       COPD:  He presents for follow up of COPD.  Overall, COPD symptoms are stable since last visit.  He has same as usual fatigue or shortness of breath with exertion and same as usual shortness of breath at rest.  He often coughs and does not have change in sputum. No recent fever. He can walk less than a mile without stopping to rest. He can walk 2 flights of stairs without resting. The patient has had no ED, urgent care, or hospital admissions because of COPD since the last visit.     Hyperlipidemia:  He presents for follow up of hyperlipidemia.   He is not taking medication to lower cholesterol. He is not having myalgia or other side effects to statin medications.    He eats 0-1 servings of fruits and vegetables daily.He consumes 0 sweetened beverage(s) daily.He exercises with enough effort to increase his heart rate 20 to 29 minutes per day.  He exercises with enough effort to increase his heart rate 4 days per week.   He is taking medications regularly.       Back pain:   Has done physical therapy.  Limited range of motion in hips.   Has used naproxen.       Breathing:  Reviewed PFTs   Mild obstructive pattern without improvement in albuterol.   Discussed using albuterol as needed.  No prior COPD exacerbations will hold off on LABA LAMA/controller therapy.      Smoking: Still smoking.  Has a List of side effects of was given to him with bupropion.  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   Has not yet taken bupropion. Concerned about the possibilities of seizure etc.       Lipids:  Reviewed lipid panel.         Review of Systems         Objective    /80 (BP Location: Right arm, Patient Position: Sitting, Cuff Size: Adult Regular)   Pulse 105   Temp 96.9  F (36.1  C)  "(Temporal)   Resp 16   Ht 1.734 m (5' 8.25\")   Wt 89.6 kg (197 lb 9.6 oz)   SpO2 98%   BMI 29.83 kg/m    Body mass index is 29.83 kg/m .  Physical Exam   General: 63-year-old man sitting clinic no acute distress  MSK: Only able to abduct his hips approximately 30 degrees    Reviewed x-rays of his hips with patient in clinic today                  "

## 2023-10-12 ENCOUNTER — HOSPITAL ENCOUNTER (OUTPATIENT)
Dept: MRI IMAGING | Facility: OTHER | Age: 63
Discharge: HOME | End: 2023-10-12
Attending: STUDENT IN AN ORGANIZED HEALTH CARE EDUCATION/TRAINING PROGRAM | Admitting: STUDENT IN AN ORGANIZED HEALTH CARE EDUCATION/TRAINING PROGRAM
Payer: COMMERCIAL

## 2023-10-12 DIAGNOSIS — M54.16 LUMBAR RADICULOPATHY: ICD-10-CM

## 2023-10-12 PROCEDURE — 72148 MRI LUMBAR SPINE W/O DYE: CPT

## 2023-10-14 DIAGNOSIS — M54.16 LUMBAR RADICULOPATHY: ICD-10-CM

## 2023-10-14 DIAGNOSIS — M48.061 SPINAL STENOSIS, LUMBAR REGION, WITHOUT NEUROGENIC CLAUDICATION: Primary | ICD-10-CM

## 2023-10-17 ENCOUNTER — HOSPITAL ENCOUNTER (OUTPATIENT)
Facility: OTHER | Age: 63
Discharge: HOME | End: 2023-10-17
Attending: SURGERY | Admitting: SURGERY
Payer: COMMERCIAL

## 2023-10-17 ENCOUNTER — ANESTHESIA EVENT (OUTPATIENT)
Dept: SURGERY | Facility: OTHER | Age: 63
End: 2023-10-17
Payer: COMMERCIAL

## 2023-10-17 ENCOUNTER — ANESTHESIA (OUTPATIENT)
Dept: SURGERY | Facility: OTHER | Age: 63
End: 2023-10-17
Payer: COMMERCIAL

## 2023-10-17 VITALS
TEMPERATURE: 97 F | OXYGEN SATURATION: 97 % | WEIGHT: 197 LBS | SYSTOLIC BLOOD PRESSURE: 110 MMHG | BODY MASS INDEX: 29.86 KG/M2 | DIASTOLIC BLOOD PRESSURE: 99 MMHG | HEIGHT: 68 IN | HEART RATE: 82 BPM | RESPIRATION RATE: 18 BRPM

## 2023-10-17 PROCEDURE — 45378 DIAGNOSTIC COLONOSCOPY: CPT | Performed by: SURGERY

## 2023-10-17 PROCEDURE — 250N000011 HC RX IP 250 OP 636: Performed by: NURSE ANESTHETIST, CERTIFIED REGISTERED

## 2023-10-17 PROCEDURE — 250N000009 HC RX 250: Performed by: NURSE ANESTHETIST, CERTIFIED REGISTERED

## 2023-10-17 PROCEDURE — 258N000003 HC RX IP 258 OP 636: Performed by: SURGERY

## 2023-10-17 PROCEDURE — 45385 COLONOSCOPY W/LESION REMOVAL: CPT | Mod: PT | Performed by: SURGERY

## 2023-10-17 PROCEDURE — 88305 TISSUE EXAM BY PATHOLOGIST: CPT

## 2023-10-17 PROCEDURE — 45385 COLONOSCOPY W/LESION REMOVAL: CPT | Performed by: NURSE ANESTHETIST, CERTIFIED REGISTERED

## 2023-10-17 PROCEDURE — 999N000010 HC STATISTIC ANES STAT CODE-CRNA PER MINUTE: Performed by: SURGERY

## 2023-10-17 RX ORDER — PROPOFOL 10 MG/ML
INJECTION, EMULSION INTRAVENOUS CONTINUOUS PRN
Status: DISCONTINUED | OUTPATIENT
Start: 2023-10-17 | End: 2023-10-17

## 2023-10-17 RX ORDER — SODIUM CHLORIDE, SODIUM LACTATE, POTASSIUM CHLORIDE, CALCIUM CHLORIDE 600; 310; 30; 20 MG/100ML; MG/100ML; MG/100ML; MG/100ML
INJECTION, SOLUTION INTRAVENOUS CONTINUOUS
Status: DISCONTINUED | OUTPATIENT
Start: 2023-10-17 | End: 2023-10-17 | Stop reason: HOSPADM

## 2023-10-17 RX ORDER — PROPOFOL 10 MG/ML
INJECTION, EMULSION INTRAVENOUS PRN
Status: DISCONTINUED | OUTPATIENT
Start: 2023-10-17 | End: 2023-10-17

## 2023-10-17 RX ORDER — LIDOCAINE 40 MG/G
CREAM TOPICAL
Status: DISCONTINUED | OUTPATIENT
Start: 2023-10-17 | End: 2023-10-17 | Stop reason: HOSPADM

## 2023-10-17 RX ORDER — LIDOCAINE HYDROCHLORIDE 20 MG/ML
INJECTION, SOLUTION INFILTRATION; PERINEURAL PRN
Status: DISCONTINUED | OUTPATIENT
Start: 2023-10-17 | End: 2023-10-17

## 2023-10-17 RX ADMIN — LIDOCAINE HYDROCHLORIDE 40 MG: 20 INJECTION, SOLUTION INFILTRATION; PERINEURAL at 08:04

## 2023-10-17 RX ADMIN — SODIUM CHLORIDE, POTASSIUM CHLORIDE, SODIUM LACTATE AND CALCIUM CHLORIDE: 600; 310; 30; 20 INJECTION, SOLUTION INTRAVENOUS at 07:46

## 2023-10-17 RX ADMIN — PROPOFOL 120 MG: 10 INJECTION, EMULSION INTRAVENOUS at 08:04

## 2023-10-17 RX ADMIN — PROPOFOL 175 MCG/KG/MIN: 10 INJECTION, EMULSION INTRAVENOUS at 08:04

## 2023-10-17 ASSESSMENT — ACTIVITIES OF DAILY LIVING (ADL): ADLS_ACUITY_SCORE: 35

## 2023-10-17 ASSESSMENT — COPD QUESTIONNAIRES
CAT_SEVERITY: MILD
COPD: 1

## 2023-10-17 ASSESSMENT — LIFESTYLE VARIABLES: TOBACCO_USE: 1

## 2023-10-17 NOTE — ANESTHESIA POSTPROCEDURE EVALUATION
Patient: Ray Ace    Procedure: Procedure(s):  Colonoscopy with polypectomy       Anesthesia Type:  MAC    Note:  Disposition: Outpatient   Postop Pain Control: Uneventful            Sign Out: Well controlled pain   PONV: No   Neuro/Psych: Uneventful            Sign Out: Acceptable/Baseline neuro status   Airway/Respiratory: Uneventful            Sign Out: Acceptable/Baseline resp. status   CV/Hemodynamics: Uneventful            Sign Out: Acceptable CV status; No obvious hypovolemia; No obvious fluid overload   Other NRE: NONE   DID A NON-ROUTINE EVENT OCCUR?            Last vitals:  Vitals Value Taken Time   /99 10/17/23 0900   Temp 97  F (36.1  C) 10/17/23 0845   Pulse 80 10/17/23 0853   Resp 18 10/17/23 0845   SpO2 97 % 10/17/23 0904   Vitals shown include unfiled device data.    Electronically Signed By: KANDICE Robertson CRNA  October 17, 2023  10:06 AM

## 2023-10-17 NOTE — ANESTHESIA CARE TRANSFER NOTE
Patient: Ray Ace    Procedure: Procedure(s):  Colonoscopy with polypectomy       Diagnosis: Encounter for screening colonoscopy [Z12.11]  Diagnosis Additional Information: No value filed.    Anesthesia Type:   MAC     Note:    Oropharynx: oropharynx clear of all foreign objects and spontaneously breathing  Level of Consciousness: drowsy  Oxygen Supplementation: room air    Independent Airway: airway patency satisfactory and stable  Dentition: dentition unchanged  Vital Signs Stable: post-procedure vital signs reviewed and stable  Report to RN Given: handoff report given  Patient transferred to: Phase II    Handoff Report: Identifed the Patient, Identified the Reponsible Provider, Reviewed the pertinent medical history, Discussed the surgical course, Reviewed Intra-OP anesthesia mangement and issues during anesthesia, Set expectations for post-procedure period and Allowed opportunity for questions and acknowledgement of understanding    Vitals:  Vitals Value Taken Time   /64 10/17/23 0826   Temp 98  F (36.7  C) 10/17/23 0826   Pulse 87 10/17/23 0826   Resp     SpO2 95 % 10/17/23 0826       Electronically Signed By: KANDICE Robertson CRNA  October 17, 2023  8:28 AM

## 2023-10-17 NOTE — OP NOTE
COLONOSCOPY PROCEDURE NOTE    DATE OF SERVICE: 10/17/2023    SURGEON: JAS Cameron MD     PRE-OP DIAGNOSIS:    Healthcare maintenance   Screening for colon cancer    POST-OP DIAGNOSIS:    Internal hemorrhoids  Polyps at rectum    PROCEDURE:   Colonoscopy with snare polypectomy    ASSISTANT:  Circulator: Yari Gaines RN  Scrub Person: Gaye Guerra    ANESTHESIA:  MAC                            MACCRNA Independent: Nithin Harris APRN CRNA    INDICATION FOR THE PROCEDURE: The patient is a 63 year old male with average risk for colon cancer. The patient has no other complaints.  After explaining the risks to include bleeding, perforation, potential inability to reach the cecum the patient wishes to proceed.    PROCEDURE: After adequate sedation, the patient was in the left lateral decubitus position.  Rectal exam was performed.  There was normal tone and no palpable masses.  The colonoscope was introduced into the rectum and advanced to the cecum with Mild difficulty.  The patient's prep was good.  The terminal cecum was reached.  The cecum, ascending, transverse, descending and sigmoid colon were significant for one 3mm polyp in the upper rectum.  The scope was retroflexed in the rectum.  The anorectal junction was remarkable for grade I-II internal hemorrhoids.  The scope was straightened and removed.  The patient tolerated the procedure well.     ESTIMATED BLOOD LOSS: none    COMPLICATIONS:  None    TISSUE REMOVED:  Yes    RECOMMEND:    Follow-up in 10 years        JAS Cameron MD

## 2023-10-17 NOTE — OR NURSING
Patient was discharged home via ambulatory.   Discharge instructions were reviewed with patient and wife. And they verbalized understanding.   Prescriptions: none

## 2023-10-17 NOTE — H&P
GENERAL SURGERY CONSULTATION NOTE    Ray Ace   [address] [facility] [address]  63 year old  male    Primary Care Provider:  Torrey Mancuso      HPI: Ray Ace presents to day surgery in need of colonoscopy for screening for colon cancer.   Ray Ace denies family history of colon cancer. Patient denies change in bowel habits or blood in stools. Previous colonoscopy was ~30 yr ago.     REVIEW OF SYSTEMS:    GENERAL: No fevers or chills. Denies fatigue, recent weight loss.  HEENT: No sinus drainage. No changes with vision or hearing. No difficulty swallowing.   LYMPHATICS:  Noswollen nodes in axilla, neck or groin.  CARDIOVASCULAR: Denies chest pain, palpitations and dyspnea on exertion.  PULMONARY: No shortness of breath or cough. No increase in sputum production.  GI: Denies melena,bright red blood in stools. No hematemesis. No constipation or diarrhea.  : No dysuria or hematuria.  SKIN: No recent rashes or ulcers.   HEMATOLOGY:  No history of easy bruising or bleeding.  ENDOCRINE:  No history of diabetes or thyroid problems.  NEUROLOGY:  No history of seizures or headaches. No motor or sensory changes.        Patient Active Problem List   Diagnosis    Spinal stenosis, lumbar region, without neurogenic claudication       History reviewed. No pertinent past medical history.    Past Surgical History:   Procedure Laterality Date    COLONOSCOPY      Age 36 due to bleeding    wisdom teeth      in teens       Family History   Problem Relation Age of Onset    Arthritis Mother     Kidney Cancer Mother     Chronic Obstructive Pulmonary Disease Father     Pancreatic Cancer Paternal Grandfather        Social History     Social History Narrative     30 years Aparna, she is employed at [employer]. 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     Liked golfing, boating, less mobile.     Two sons both suffer from alcoholism.         Social History     Socioeconomic History    Marital status:      Spouse name: Not on file    Number of children: Not on file    Years of education: Not on file    Highest education level: Not on file   Occupational History    Not on file   Tobacco Use    Smoking status: Every Day     Types: Cigarettes    Smokeless tobacco: Never   Vaping Use    Vaping Use: Never used   Substance and Sexual Activity    Alcohol use: Yes     Comment: very rarely    Drug use: Never    Sexual activity: Not Currently   Other Topics Concern    Not on file   Social History Narrative     30 years Aparna, she is employed at [employer]. 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     Liked golfing, boating, less mobile.     Two sons both suffer from alcoholism.      Social Determinants of Health     Financial Resource Strain: Low Risk  (9/25/2023)    Financial Resource Strain     Within the past 12 months, have you or your family members you live with been unable to get utilities (heat, electricity) when it was really needed?: No   Food Insecurity: Low Risk  (9/25/2023)    Food Insecurity     Within the past 12 months, did you worry that your food would run out before you got money to buy more?: No     Within the past 12 months, did the food you bought just not last and you didn t have money to get more?: No   Transportation Needs: Low Risk  (9/25/2023)    Transportation Needs     Within the past 12 months, has lack of transportation kept you from medical appointments, getting your medicines, non-medical meetings or appointments, work, or from getting things that you need?: No   Physical Activity: Not on file   Stress: Not on file   Social Connections: Not on file   Interpersonal Safety: Low Risk  (9/25/2023)    Interpersonal Safety     Do you feel physically and emotionally safe where you currently live?: Yes     Within the past 12 months, have you been hit, slapped,  "kicked or otherwise physically hurt by someone?: No     Within the past 12 months, have you been humiliated or emotionally abused in other ways by your partner or ex-partner?: No   Housing Stability: Low Risk  (9/25/2023)    Housing Stability     Do you have housing? : Yes     Are you worried about losing your housing?: No       No current facility-administered medications on file prior to encounter.  albuterol (PROAIR HFA/PROVENTIL HFA/VENTOLIN HFA) 108 (90 Base) MCG/ACT inhaler, Inhale 2 puffs into the lungs every 4 hours as needed for shortness of breath, wheezing or cough  buPROPion (WELLBUTRIN XL) 150 MG 24 hr tablet, Take 1 tablet (150 mg) by mouth every morning          ALLERGIES/SENSITIVITIES: No Known Allergies    PHYSICAL EXAM:     /89 (Cuff Size: Adult Regular)   Pulse 104   Temp 98.4  F (36.9  C) (Tympanic)   Resp 16   Ht 1.734 m (5' 8.25\")   Wt 89.4 kg (197 lb)   SpO2 96%   BMI 29.73 kg/m      General Appearance:   Sitting up in bed, no apparent distress  HEENT: Pupils are equal and reactive, no scleral icterus   Heart & CV:  RRR, no murmur.  LUNGS: No increased work of breathing. Lungs are CTA B/L, no wheezing or crackles.  Abd:  soft, non-tender, no masses   Ext: no lower extremity edema   Neuro: alert and oriented, normal speech and mentation         CONSULTATION ASSESSMENT AND PLAN:    63 year old male with average risk for colon cancer     The technical details of colonoscopy were discussed with the patient along with the risks and benefits to include bleeding, perforation and incomplete study. Ray Ace demonstrated understanding and is willing to proceed.       Chuck Cameron MD on 10/17/2023 at 7:47 AM      "

## 2023-10-17 NOTE — ANESTHESIA PREPROCEDURE EVALUATION
"Anesthesia Pre-Procedure Evaluation    Patient: Ray Ace   MRN: [id number] : 1960        Procedure : Procedure(s):  Colonoscopy          History reviewed. No pertinent past medical history.   Past Surgical History:   Procedure Laterality Date    COLONOSCOPY      Age 36 due to bleeding    wisdom teeth      in teens      No Known Allergies   Social History     Tobacco Use    Smoking status: Every Day     Types: Cigarettes    Smokeless tobacco: Never   Substance Use Topics    Alcohol use: Yes     Comment: very rarely      Wt Readings from Last 1 Encounters:   23 89.6 kg (197 lb 9.6 oz)        Anesthesia Evaluation   Pt has had prior anesthetic.     No history of anesthetic complications       ROS/MED HX  ENT/Pulmonary: Comment: Smoking cessation information given to patient.     (+)                tobacco use, Current use,       mild,  COPD,              Neurologic:  - neg neurologic ROS     Cardiovascular:     (+) Dyslipidemia - -   -  - -                                      METS/Exercise Tolerance: >4 METS    Hematologic:  - neg hematologic  ROS     Musculoskeletal:  - neg musculoskeletal ROS     GI/Hepatic:     (+)        bowel prep,            Renal/Genitourinary:  - neg Renal ROS     Endo:  - neg endo ROS     Psychiatric/Substance Use:  - neg psychiatric ROS     Infectious Disease:  - neg infectious disease ROS     Malignancy:  - neg malignancy ROS     Other:  - neg other ROS          Physical Exam    Airway        Mallampati: II   TM distance: > 3 FB   Neck ROM: full   Mouth opening: > 3 cm    Respiratory Devices and Support         Dental           Cardiovascular   cardiovascular exam normal       Rhythm and rate: regular and normal     Pulmonary   pulmonary exam normal        breath sounds clear to auscultation           OUTSIDE LABS:  CBC: No results found for: \"WBC\", \"HGB\", \"HCT\", \"PLT\"  BMP:   Lab Results   Component Value Date     2023    POTASSIUM 4.6 2023    " "CHLORIDE 104 07/11/2023    CO2 25 07/11/2023    BUN 13.3 07/11/2023    CR 0.73 07/11/2023    GLC 90 07/11/2023     COAGS: No results found for: \"PTT\", \"INR\", \"FIBR\"  POC: No results found for: \"BGM\", \"HCG\", \"HCGS\"  HEPATIC:   Lab Results   Component Value Date    ALBUMIN 4.4 07/11/2023    PROTTOTAL 7.6 07/11/2023    ALT 14 07/11/2023    AST 15 07/11/2023    ALKPHOS 76 07/11/2023    BILITOTAL 0.2 07/11/2023     OTHER:   Lab Results   Component Value Date    CAROLINA 9.8 07/11/2023       Anesthesia Plan    ASA Status:  2    NPO Status:  NPO Appropriate    Anesthesia Type: MAC.     - Reason for MAC: straight local not clinically adequate   Induction: Intravenous.           Consents    Anesthesia Plan(s) and associated risks, benefits, and realistic alternatives discussed. Questions answered and patient/representative(s) expressed understanding.     - Discussed: Risks, Benefits and Alternatives for BOTH SEDATION and the PROCEDURE were discussed     - Discussed with:  Patient            Postoperative Care            Comments:                KANDICE LEES CRNA  "

## 2023-10-17 NOTE — DISCHARGE INSTRUCTIONS
[facility]  Adult Discharge Orders & Instructions    ________________________________________________________________          For 12 hours after surgery  Get plenty of rest.  A responsible adult must stay with you for at least 12 hours after you leave the hospital.   You may feel lightheaded.  IF so, sit for a few minutes before standing.  Have someone help you get up.   You may have a slight fever. Call the doctor if your fever is over 101 F (38.3 C) (taken under the tongue) or lasts longer than 24 hours.  You may have a dry mouth, a sore throat, muscle aches or trouble sleeping.  These should go away after 24 hours.  Do not make important or legal decisions.  6.   Do not drive or use heavy equipment.  If you have weakness or tingling, don't drive or use heavy equipment until this feeling goes away.    To contact a doctor, call   [phone]_______________________

## 2023-10-20 LAB
PATH REPORT.COMMENTS IMP SPEC: NORMAL
PATH REPORT.FINAL DX SPEC: NORMAL
PATH REPORT.RELEVANT HX SPEC: NORMAL
PHOTO IMAGE: NORMAL

## 2023-11-08 ENCOUNTER — OFFICE VISIT (OUTPATIENT)
Dept: ORTHOPEDICS | Facility: OTHER | Age: 63
End: 2023-11-08
Attending: ORTHOPAEDIC SURGERY
Payer: COMMERCIAL

## 2023-11-08 VITALS — WEIGHT: 193 LBS | BODY MASS INDEX: 29.13 KG/M2 | OXYGEN SATURATION: 97 % | HEART RATE: 99 BPM

## 2023-11-08 DIAGNOSIS — M25.552 BILATERAL HIP PAIN: Primary | ICD-10-CM

## 2023-11-08 DIAGNOSIS — M25.551 BILATERAL HIP PAIN: Primary | ICD-10-CM

## 2023-11-08 PROCEDURE — 99203 OFFICE O/P NEW LOW 30 MIN: CPT | Performed by: ORTHOPAEDIC SURGERY

## 2023-11-08 RX ORDER — NAPROXEN SODIUM 220 MG
220 TABLET ORAL 2 TIMES DAILY WITH MEALS
COMMUNITY

## 2023-11-08 ASSESSMENT — PAIN SCALES - GENERAL: PAINLEVEL: MODERATE PAIN (5)

## 2023-11-08 NOTE — PROGRESS NOTES
Surgical Clinic Consult  Primary physician:     Torrey Mancuso    Chief complaint:   Bilateral hip pain    History of present illness:  This is a 63 year old male I am seeing in consultation for bilateral hip pain with significant arthrosis right being greater than left.  Patient also has noted lumbar stenosis in addition to that.  Patient has plans to see Dr. Emeka Wakefield next week.  Patient has been  in physical therapy for 3 weeks with minimal change.  Patient is here to explore options.  Patient does report a fair amount of stiffness in regards to his hips as well.  Patient is here to consider his options.    Past medical history:   No past medical history on file.    Pastsurgical history:  Past Surgical History:   Procedure Laterality Date    COLONOSCOPY      Age 36 due to bleeding    COLONOSCOPY N/A 10/17/2023    hyperplastic, follow up 10/17/33    wisdom teeth      in teens       Current medications:  Current Outpatient Medications   Medication Sig Dispense Refill    naproxen sodium (ANAPROX) 220 MG tablet Take 220 mg by mouth 2 times daily (with meals)      albuterol (PROAIR HFA/PROVENTIL HFA/VENTOLIN HFA) 108 (90 Base) MCG/ACT inhaler Inhale 2 puffs into the lungs every 4 hours as needed for shortness of breath, wheezing or cough 18 g 3    buPROPion (WELLBUTRIN XL) 150 MG 24 hr tablet Take 1 tablet (150 mg) by mouth every morning 90 tablet 4    rosuvastatin (CRESTOR) 10 MG tablet Take 1 tablet (10 mg) by mouth daily 90 tablet 4       Allergies:  No Known Allergies    Family history:  Family History   Problem Relation Age of Onset    Arthritis Mother     Kidney Cancer Mother     Chronic Obstructive Pulmonary Disease Father     Pancreatic Cancer Paternal Grandfather        Social history:  Social History     Socioeconomic History    Marital status:      Spouse name: Not on file    Number of children: Not on file    Years of education: Not on file    Highest education level: Not on file   Occupational  History    Not on file   Tobacco Use    Smoking status: Every Day     Types: Cigarettes    Smokeless tobacco: Never   Vaping Use    Vaping Use: Never used   Substance and Sexual Activity    Alcohol use: Yes     Comment: very rarely    Drug use: Never    Sexual activity: Not Currently   Other Topics Concern    Not on file   Social History Narrative     30 years Apar[place], she is employed at [employer]. 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     Liked golfing, boating, less mobile.     Two sons both suffer from alcoholism.      Social Determinants of Health     Financial Resource Strain: Low Risk  (9/25/2023)    Financial Resource Strain     Within the past 12 months, have you or your family members you live with been unable to get utilities (heat, electricity) when it was really needed?: No   Food Insecurity: Low Risk  (9/25/2023)    Food Insecurity     Within the past 12 months, did you worry that your food would run out before you got money to buy more?: No     Within the past 12 months, did the food you bought just not last and you didn t have money to get more?: No   Transportation Needs: Low Risk  (9/25/2023)    Transportation Needs     Within the past 12 months, has lack of transportation kept you from medical appointments, getting your medicines, non-medical meetings or appointments, work, or from getting things that you need?: No   Physical Activity: Not on file   Stress: Not on file   Social Connections: Not on file   Interpersonal Safety: Low Risk  (9/25/2023)    Interpersonal Safety     Do you feel physically and emotionally safe where you currently live?: Yes     Within the past 12 months, have you been hit, slapped, kicked or otherwise physically hurt by someone?: No     Within the past 12 months, have you been humiliated or emotionally abused in other ways by your partner or ex-partner?: No   Housing Stability: Low Risk  (9/25/2023)     Housing Stability     Do you have housing? : Yes     Are you worried about losing your housing?: No       PROBLEM LIST:  Patient Active Problem List   Diagnosis    Spinal stenosis, lumbar region, without neurogenic claudication       Review of Systems:  COMPLETE 12 point REVIEW OF SYSTEMS is otherwise negative with the exception of which is stated above.    Physical exam: Pulse 99   Wt 87.5 kg (193 lb)   SpO2 97%   BMI 29.13 kg/m      General: this is a pleasant male patient in no acute distress.  Patient is awake alert and oriented x3 .   EXAM:  Chest/Respiratory Exam: Normal - Clear to auscultation without rales, rhonchi, or wheezing.  Cardiovascular Exam: normal  Musculoskeletal: Bilateral hip examination shows hip flexed to 90.  Crepitation with flexion extension noted.  Internal rotation to 5 bilaterally.  Minimal pain with hip abduction noted.  Discomfort with straight leg raise present as well.    Imagin views of each hip reveal significant arthrosis right hip more impacted than left.    Assessment:   Bilateral hip osteoarthrosis.  Lumbar stenosis L4-5 L5-S1    Plan:    Bilateral hip intra-articular injections here to start with.  He is also going to see Emeka Wakefield for further recommendations on his back.  I certainly feel long-term joint reconstruction is going to be warranted for patient starting out with his right hip.  He wants to try injections first and gauge his response in regards to that as well.      Niko Salguero MD

## 2023-11-24 ENCOUNTER — HOSPITAL ENCOUNTER (OUTPATIENT)
Dept: GENERAL RADIOLOGY | Facility: OTHER | Age: 63
Discharge: HOME | End: 2023-11-24
Attending: ORTHOPAEDIC SURGERY
Payer: COMMERCIAL

## 2023-11-24 DIAGNOSIS — M25.552 BILATERAL HIP PAIN: ICD-10-CM

## 2023-11-24 DIAGNOSIS — M25.551 BILATERAL HIP PAIN: ICD-10-CM

## 2023-11-24 PROCEDURE — 250N000009 HC RX 250: Performed by: RADIOLOGY

## 2023-11-24 PROCEDURE — 77002 NEEDLE LOCALIZATION BY XRAY: CPT

## 2023-11-24 PROCEDURE — 255N000002 HC RX 255 OP 636: Performed by: RADIOLOGY

## 2023-11-24 PROCEDURE — 250N000011 HC RX IP 250 OP 636: Mod: JZ | Performed by: RADIOLOGY

## 2023-11-24 PROCEDURE — 250N000011 HC RX IP 250 OP 636: Performed by: RADIOLOGY

## 2023-11-24 RX ORDER — TRIAMCINOLONE ACETONIDE 40 MG/ML
40 INJECTION, SUSPENSION INTRA-ARTICULAR; INTRAMUSCULAR ONCE
Status: COMPLETED | OUTPATIENT
Start: 2023-11-24 | End: 2023-11-24

## 2023-11-24 RX ORDER — LIDOCAINE HYDROCHLORIDE 10 MG/ML
2 INJECTION, SOLUTION INFILTRATION; PERINEURAL ONCE
Status: COMPLETED | OUTPATIENT
Start: 2023-11-24 | End: 2023-11-24

## 2023-11-24 RX ORDER — BUPIVACAINE HYDROCHLORIDE 5 MG/ML
2 INJECTION, SOLUTION EPIDURAL; INTRACAUDAL ONCE
Status: COMPLETED | OUTPATIENT
Start: 2023-11-24 | End: 2023-11-24

## 2023-11-24 RX ADMIN — TRIAMCINOLONE ACETONIDE 40 MG: 40 INJECTION, SUSPENSION INTRA-ARTICULAR; INTRAMUSCULAR at 11:59

## 2023-11-24 RX ADMIN — BUPIVACAINE HYDROCHLORIDE 3 ML: 5 INJECTION, SOLUTION EPIDURAL; INTRACAUDAL at 11:58

## 2023-11-24 RX ADMIN — TRIAMCINOLONE ACETONIDE 40 MG: 40 INJECTION, SUSPENSION INTRA-ARTICULAR; INTRAMUSCULAR at 11:57

## 2023-11-24 RX ADMIN — IOHEXOL 1 ML: 240 INJECTION, SOLUTION INTRATHECAL; INTRAVASCULAR; INTRAVENOUS; ORAL at 11:56

## 2023-11-24 RX ADMIN — LIDOCAINE HYDROCHLORIDE 1.5 ML: 10 INJECTION, SOLUTION INFILTRATION; PERINEURAL at 11:59

## 2023-11-24 RX ADMIN — LIDOCAINE HYDROCHLORIDE 1.5 ML: 10 INJECTION, SOLUTION INFILTRATION; PERINEURAL at 11:57

## 2023-11-24 RX ADMIN — BUPIVACAINE HYDROCHLORIDE 3 ML: 5 INJECTION, SOLUTION EPIDURAL; INTRACAUDAL at 11:57

## 2023-11-24 RX ADMIN — IOHEXOL 1 ML: 240 INJECTION, SOLUTION INTRATHECAL; INTRAVASCULAR; INTRAVENOUS; ORAL at 11:58

## 2023-12-14 ENCOUNTER — TRANSFERRED RECORDS (OUTPATIENT)
Dept: HEALTH INFORMATION MANAGEMENT | Facility: OTHER | Age: 63
End: 2023-12-14
Payer: COMMERCIAL

## 2024-09-22 ENCOUNTER — HEALTH MAINTENANCE LETTER (OUTPATIENT)
Age: 64
End: 2024-09-22

## 2024-12-19 DIAGNOSIS — E78.2 MIXED HYPERLIPIDEMIA: ICD-10-CM

## 2024-12-23 RX ORDER — ROSUVASTATIN CALCIUM 10 MG/1
10 TABLET, COATED ORAL DAILY
Qty: 90 TABLET | Refills: 0 | Status: SHIPPED | OUTPATIENT
Start: 2024-12-23

## 2024-12-23 NOTE — TELEPHONE ENCOUNTER
Left message to return call to talk about refill . Patient has no records for past year need to no if his medical care might have moved to different PCP and clinic.    Chay Hirsch RN on 12/23/2024 at 1:50 PM

## 2024-12-23 NOTE — TELEPHONE ENCOUNTER
[id number] sent Rx request for the following:      Requested Prescriptions   Pending Prescriptions Disp Refills    rosuvastatin (CRESTOR) 10 MG tablet [Pharmacy Med Name: Rosuvastatin Calcium 10 MG Oral Tablet] 90 tablet 0     Sig: Take 1 tablet by mouth once daily       Antihyperlipidemic agents Failed - 12/23/2024  2:15 PM        Failed - LDL on file in the past 12 months        Failed - Recent (12 mo) or future (90 days) visit within the authorizing provider's specialty     The patient must have completed an in-person or virtual visit within the past 12 months or has a future visit scheduled within the next 90 days with the authorizing provider s specialty.  Urgent care and e-visits do not qualify as an office visit for this protocol.          Last Prescription Date:   9/25/2023  Last Fill Qty/Refills:         90, R-4    Last Office Visit:              7/11/2023   Future Office visit:           none     Unable to complete prescription refill per RN Medication Refill Policy.     Talk to patient he is out of this medication and has not been to a doctor in over a year anywhere. Talk to him about how the doctor might want fasting lab and a yearly visit done and the scheduling will call him back for this . But will rout to pcp and scheduling for review and approval.     Chay Hirsch RN on 12/23/2024 at 2:22 PM

## (undated) DEVICE — SOL WATER 1500ML

## (undated) DEVICE — TUBING SUCTION 10'X3/16" N510

## (undated) DEVICE — ENDO KIT COMPLIANCE DYKENDOCMPLY

## (undated) DEVICE — DEVICE RETRIEVAL ROTH NET 3.89MMX160CM 00711155

## (undated) DEVICE — SUCTION MANIFOLD NEPTUNE 2 SYS 4 PORT 0702-020-000

## (undated) DEVICE — ENDO BRUSH CHANNEL MASTER CLEANING 2-4.2MM BW-412T

## (undated) DEVICE — ENDO TRAP POLYP E-TRAP 00711099

## (undated) RX ORDER — LIDOCAINE HYDROCHLORIDE 10 MG/ML
INJECTION, SOLUTION EPIDURAL; INFILTRATION; INTRACAUDAL; PERINEURAL
Status: DISPENSED
Start: 2023-11-24

## (undated) RX ORDER — LIDOCAINE HYDROCHLORIDE 10 MG/ML
INJECTION, SOLUTION INFILTRATION; PERINEURAL
Status: DISPENSED
Start: 2023-11-24

## (undated) RX ORDER — PROPOFOL 10 MG/ML
INJECTION, EMULSION INTRAVENOUS
Status: DISPENSED
Start: 2023-10-17

## (undated) RX ORDER — BUPIVACAINE HYDROCHLORIDE 5 MG/ML
INJECTION, SOLUTION EPIDURAL; INTRACAUDAL
Status: DISPENSED
Start: 2023-11-24

## (undated) RX ORDER — TRIAMCINOLONE ACETONIDE 40 MG/ML
INJECTION, SUSPENSION INTRA-ARTICULAR; INTRAMUSCULAR
Status: DISPENSED
Start: 2023-11-24